# Patient Record
Sex: FEMALE | Race: WHITE | Employment: OTHER | URBAN - METROPOLITAN AREA
[De-identification: names, ages, dates, MRNs, and addresses within clinical notes are randomized per-mention and may not be internally consistent; named-entity substitution may affect disease eponyms.]

---

## 2017-05-20 PROBLEM — B00.89 HERPES DERMATITIS: Status: ACTIVE | Noted: 2017-05-20

## 2017-06-15 PROBLEM — K63.5 BENIGN COLON POLYP: Status: ACTIVE | Noted: 2017-06-15

## 2018-03-07 PROBLEM — Z86.0100 HISTORY OF COLONIC POLYPS: Status: ACTIVE | Noted: 2018-03-07

## 2018-09-04 PROBLEM — G89.29 CHRONIC UPPER BACK PAIN: Status: ACTIVE | Noted: 2018-09-04

## 2023-09-11 ENCOUNTER — HOSPITAL ENCOUNTER (OUTPATIENT)
Dept: MAMMOGRAPHY | Age: 68
Discharge: HOME | End: 2023-09-11
Payer: MEDICARE

## 2023-09-11 DIAGNOSIS — Z12.31 VISIT FOR SCREENING MAMMOGRAM: ICD-10-CM

## 2023-09-11 PROCEDURE — 77063 BREAST TOMOSYNTHESIS BI: CPT

## 2024-10-17 ENCOUNTER — HOSPITAL ENCOUNTER (OUTPATIENT)
Dept: MAMMOGRAPHY | Age: 69
Discharge: HOME | End: 2024-10-17
Payer: MEDICARE

## 2024-10-17 DIAGNOSIS — Z12.31 ENCOUNTER FOR SCREENING MAMMOGRAM FOR BREAST CANCER: ICD-10-CM

## 2024-10-17 PROCEDURE — 77063 BREAST TOMOSYNTHESIS BI: CPT

## 2025-08-01 ENCOUNTER — TELEPHONE (OUTPATIENT)
Dept: INTERNAL MEDICINE CLINIC | Age: 70
End: 2025-08-01

## 2025-08-01 NOTE — TELEPHONE ENCOUNTER
Pt did call back and was informed. She prefers a female DO. We have  who is a DO but male. She was given the [facility] Physician number [phone] to call to see if they can help her.

## 2025-08-01 NOTE — TELEPHONE ENCOUNTER
----- Message from Anjelica VEGA sent at 8/1/2025  3:36 PM EDT -----  Regarding: [facility] Appointment Request  [facility] Appointment Request    Patient needs appointment for [facility] Appointment Type: New Patient.    Patient Requested Dates(s): any day   Patient Requested Time: anytime   Provider Name: Abby HERNANDEZ DO [place]      Reason for Appointment Request: New Patient - No appointments available during search  --------------------------------------------------------------------------------------------------------------------------    Relationship to Patient: Self     Call Back Information: OK to leave message on voicemail  Preferred Call Back Number:  [phone]

## 2025-08-01 NOTE — TELEPHONE ENCOUNTER
Pt was called - line rang but pt did not answer and VM did not .     is not accepting new pts. We do have provider who are if she would like to schedule.    If pt calls back, please assist with scheduling.